# Patient Record
Sex: FEMALE | Race: WHITE | Employment: OTHER | URBAN - METROPOLITAN AREA
[De-identification: names, ages, dates, MRNs, and addresses within clinical notes are randomized per-mention and may not be internally consistent; named-entity substitution may affect disease eponyms.]

---

## 2017-01-18 ENCOUNTER — HOSPITAL ENCOUNTER (OUTPATIENT)
Age: 82
Setting detail: OUTPATIENT SURGERY
Discharge: HOME | End: 2017-01-18
Attending: INTERNAL MEDICINE | Admitting: INTERNAL MEDICINE
Payer: MEDICARE

## 2017-01-18 ENCOUNTER — ANESTHESIA EVENT (OUTPATIENT)
Dept: ENDOSCOPY | Age: 82
End: 2017-01-18
Payer: MEDICARE

## 2017-01-18 ENCOUNTER — ANESTHESIA (OUTPATIENT)
Dept: ENDOSCOPY | Age: 82
End: 2017-01-18
Payer: MEDICARE

## 2017-01-18 VITALS
OXYGEN SATURATION: 95 % | RESPIRATION RATE: 22 BRPM | HEART RATE: 71 BPM | SYSTOLIC BLOOD PRESSURE: 128 MMHG | HEIGHT: 65 IN | BODY MASS INDEX: 20.66 KG/M2 | DIASTOLIC BLOOD PRESSURE: 39 MMHG | TEMPERATURE: 98.8 F | WEIGHT: 124 LBS

## 2017-01-18 PROCEDURE — 76040000007: Performed by: INTERNAL MEDICINE

## 2017-01-18 PROCEDURE — 88305 TISSUE EXAM BY PATHOLOGIST: CPT | Performed by: INTERNAL MEDICINE

## 2017-01-18 PROCEDURE — 77030013140 HC MSK NEB VYRM -A: Performed by: INTERNAL MEDICINE

## 2017-01-18 PROCEDURE — 74011000250 HC RX REV CODE- 250: Performed by: INTERNAL MEDICINE

## 2017-01-18 PROCEDURE — 88341 IMHCHEM/IMCYTCHM EA ADD ANTB: CPT | Performed by: INTERNAL MEDICINE

## 2017-01-18 PROCEDURE — 77030009046 HC CATH BRNCH BLLN OCOA -B: Performed by: INTERNAL MEDICINE

## 2017-01-18 PROCEDURE — 88342 IMHCHEM/IMCYTCHM 1ST ANTB: CPT | Performed by: INTERNAL MEDICINE

## 2017-01-18 PROCEDURE — 87070 CULTURE OTHR SPECIMN AEROBIC: CPT | Performed by: INTERNAL MEDICINE

## 2017-01-18 PROCEDURE — 77030015483 HC NDL ASPIR OCOA -B: Performed by: INTERNAL MEDICINE

## 2017-01-18 PROCEDURE — 88173 CYTOPATH EVAL FNA REPORT: CPT | Performed by: INTERNAL MEDICINE

## 2017-01-18 PROCEDURE — 77030022556 HC FCPS BIOP TIS ENDOSC BSC -B: Performed by: INTERNAL MEDICINE

## 2017-01-18 PROCEDURE — 74011250636 HC RX REV CODE- 250/636

## 2017-01-18 PROCEDURE — 74011000250 HC RX REV CODE- 250

## 2017-01-18 PROCEDURE — 76060000032 HC ANESTHESIA 0.5 TO 1 HR: Performed by: INTERNAL MEDICINE

## 2017-01-18 PROCEDURE — 77030026438 HC STYL ET INTUB CARD -A: Performed by: ANESTHESIOLOGY

## 2017-01-18 RX ORDER — FENTANYL CITRATE 50 UG/ML
INJECTION, SOLUTION INTRAMUSCULAR; INTRAVENOUS AS NEEDED
Status: DISCONTINUED | OUTPATIENT
Start: 2017-01-18 | End: 2017-01-18 | Stop reason: HOSPADM

## 2017-01-18 RX ORDER — LIDOCAINE HYDROCHLORIDE 20 MG/ML
2 INJECTION, SOLUTION INFILTRATION; PERINEURAL
Status: DISCONTINUED | OUTPATIENT
Start: 2017-01-18 | End: 2017-01-18 | Stop reason: HOSPADM

## 2017-01-18 RX ORDER — MAGNESIUM 200 MG
1000 TABLET ORAL DAILY
COMMUNITY

## 2017-01-18 RX ORDER — ALBUTEROL SULFATE 90 UG/1
2 AEROSOL, METERED RESPIRATORY (INHALATION)
COMMUNITY

## 2017-01-18 RX ORDER — RALOXIFENE HYDROCHLORIDE 60 MG/1
60 TABLET, FILM COATED ORAL DAILY
COMMUNITY

## 2017-01-18 RX ORDER — MELATONIN 5 MG
5 CAPSULE ORAL
COMMUNITY

## 2017-01-18 RX ORDER — TOPIRAMATE 25 MG/1
25 TABLET ORAL 2 TIMES DAILY
COMMUNITY

## 2017-01-18 RX ORDER — LIDOCAINE HYDROCHLORIDE 20 MG/ML
JELLY TOPICAL ONCE
Status: DISCONTINUED | OUTPATIENT
Start: 2017-01-18 | End: 2017-01-18 | Stop reason: HOSPADM

## 2017-01-18 RX ORDER — PHENAZOPYRIDINE HYDROCHLORIDE 100 MG/1
100 TABLET, FILM COATED ORAL
COMMUNITY

## 2017-01-18 RX ORDER — SOLIFENACIN SUCCINATE 5 MG/1
5 TABLET, FILM COATED ORAL DAILY
COMMUNITY

## 2017-01-18 RX ORDER — GLYCOPYRROLATE 0.2 MG/ML
INJECTION INTRAMUSCULAR; INTRAVENOUS AS NEEDED
Status: DISCONTINUED | OUTPATIENT
Start: 2017-01-18 | End: 2017-01-18 | Stop reason: HOSPADM

## 2017-01-18 RX ORDER — ROCURONIUM BROMIDE 10 MG/ML
INJECTION, SOLUTION INTRAVENOUS AS NEEDED
Status: DISCONTINUED | OUTPATIENT
Start: 2017-01-18 | End: 2017-01-18 | Stop reason: HOSPADM

## 2017-01-18 RX ORDER — SERTRALINE HYDROCHLORIDE 100 MG/1
100 TABLET, FILM COATED ORAL DAILY
COMMUNITY

## 2017-01-18 RX ORDER — ERGOCALCIFEROL 1.25 MG/1
50000 CAPSULE ORAL
COMMUNITY

## 2017-01-18 RX ORDER — LIDOCAINE HYDROCHLORIDE 40 MG/ML
SOLUTION TOPICAL ONCE
Status: COMPLETED | OUTPATIENT
Start: 2017-01-18 | End: 2017-01-18

## 2017-01-18 RX ORDER — NEOSTIGMINE METHYLSULFATE 1 MG/ML
INJECTION INTRAVENOUS AS NEEDED
Status: DISCONTINUED | OUTPATIENT
Start: 2017-01-18 | End: 2017-01-18 | Stop reason: HOSPADM

## 2017-01-18 RX ORDER — PROPOFOL 10 MG/ML
INJECTION, EMULSION INTRAVENOUS AS NEEDED
Status: DISCONTINUED | OUTPATIENT
Start: 2017-01-18 | End: 2017-01-18 | Stop reason: HOSPADM

## 2017-01-18 RX ADMIN — GLYCOPYRROLATE 0.2 MG: 0.2 INJECTION INTRAMUSCULAR; INTRAVENOUS at 15:06

## 2017-01-18 RX ADMIN — ROCURONIUM BROMIDE 25 MG: 10 INJECTION, SOLUTION INTRAVENOUS at 14:37

## 2017-01-18 RX ADMIN — LIDOCAINE HYDROCHLORIDE: 40 SOLUTION TOPICAL at 14:01

## 2017-01-18 RX ADMIN — PROPOFOL 120 MG: 10 INJECTION, EMULSION INTRAVENOUS at 14:36

## 2017-01-18 RX ADMIN — NEOSTIGMINE METHYLSULFATE 2 MG: 1 INJECTION INTRAVENOUS at 15:06

## 2017-01-18 RX ADMIN — FENTANYL CITRATE 100 MCG: 50 INJECTION, SOLUTION INTRAMUSCULAR; INTRAVENOUS at 14:35

## 2017-01-18 NOTE — H&P
79 yo with PMHx of COPD presenting for EBUS of paratracheal lymphnode and bronch. Pt continous to smoke and has baseline SOB. Gen: awake, alert, in no distress  Lungs: wheezing bilaterally  Abd: soft/nt  LE: no edema  Neuro: moving all extremeties    - will proceed with EBUS under general anesthesia.

## 2017-01-18 NOTE — PROGRESS NOTES
See anesthesia flow-sheet for vital signs and procedural sedation. No respiratory distress. No subcutaneous air palpated at chest/back/neck/abdomen. Samples sent to the lab. Stable for transfer to recovery per anesthesia.

## 2017-01-18 NOTE — DISCHARGE INSTRUCTIONS
Fabio Tavarez  [id number]  1935        BRONCHOSCOPY / EUS / EBUS DISCHARGE INSTRUCTIONS  Discomfort:  Sore throat- throat lozenges or warm salt water gargle  redness at IV site- apply warm compress to area; if redness or soreness persist- contact your physician  Gaseous discomfort- walking, belching will help relieve any discomfort  You may not operate a vehicle for 12 hours  You may not engage in an occupation involving machinery or appliances for rest of today  You may not drink alcoholic beverages for at least 12 hours  Avoid making any critical decisions for at least 24 hour  Blood tinged secretions - this should stop within 2-3 hours  DIET  Nothing by mouth- do not eat or drink for two hours. You may eat and drink after 5 pm  You may resume your regular diet - however - remember your colon is empty and a heavy meal will produce gas. Avoid these foods: vegetables, fried / greasy foods, carbonated drinks  ACTIVITY  You may resume your normal daily activities however it is recommended that you spend the remainder of the day resting - avoid any strenuous activity. CALL M.D.  ANY SIGN OF   Increasing pain, nausea, vomiting  Abdominal distension (swelling)  New increased bleeding (oral or rectal)  Fever (chills)  Pain in chest area  Bloody discharge from nose or mouth  Shortness of breath     You  may not take any Advil, Aspirin, Ibuprofen, Motrin, Aleve, or Goodys for 1 day, ONLY Tylenol as needed for pain.     Call physicians office for following  Results of procedure / biopsy in 5 days  Telephone # [phone]

## 2017-01-18 NOTE — IP AVS SNAPSHOT
[place] Amandalidia 
 
 
 [address] [facility] 
[phone] Patient: Mary Manley MRN: [id number] :1935 You are allergic to the following No active allergies Recent Documentation Height Weight Breastfeeding? BMI OB Status Smoking Status 1.651 m 56.2 kg No 20.63 kg/m2 Postmenopausal Current Every Day Smoker Unresulted Labs Order Current Status CULTURE, RESPIRATORY/SPUTUM/BRONCH W GRAM STAIN In process Emergency Contacts Name Discharge Info Relation Home Work Mobile Jana Stein  Son [22]   [phone] About your hospitalization You were admitted on:  2017 You last received care in the:  [facility] ENDOSCOPY You were discharged on:  2017 Unit phone number:  [phone] Why you were hospitalized Your primary diagnosis was:  Not on File Providers Seen During Your Hospitalizations Provider Role Specialty Primary office phone Marie Johnson MD Attending Provider Pulmonary Disease [phone] Your Primary Care Physician (PCP) Primary Care Physician Office Phone Office Fax Mauro Major [phone] Follow-up Information None Current Discharge Medication List  
  
ASK your doctor about these medications Dose & Instructions Dispensing Information Comments Morning Noon Evening Bedtime  
 cyanocobalamin 1,000 mcg sublingual tablet Commonly known as:  VITAMIN B-12 Your next dose is: Today, Tomorrow Other:  _________ Dose:  1000 mcg Take 1,000 mcg by mouth daily. Refills:  0  
     
   
   
   
  
 ergocalciferol 50,000 unit capsule Commonly known as:  ERGOCALCIFEROL Your next dose is: Today, Tomorrow Other:  _________ Dose:  66593 Units Take 50,000 Units by mouth.   
 Refills:  0  
     
   
   
   
  
 melatonin 5 mg Cap capsule Your next dose is: Today, Tomorrow Other:  _________ Dose:  5 mg Take 5 mg by mouth nightly. Refills:  0 PYRIDIUM 100 mg tablet Generic drug:  phenazopyridine Your next dose is: Today, Tomorrow Other:  _________ Dose:  100 mg Take 100 mg by mouth three (3) times daily (after meals). Refills:  0  
     
   
   
   
  
 raloxifene 60 mg tablet Commonly known as:  EVISTA Your next dose is: Today, Tomorrow Other:  _________ Dose:  60 mg Take 60 mg by mouth daily. Refills:  0 SENNA LAXATIVE PO Your next dose is: Today, Tomorrow Other:  _________ Take  by mouth. Refills:  0  
     
   
   
   
  
 sertraline 100 mg tablet Commonly known as:  ZOLOFT Your next dose is: Today, Tomorrow Other:  _________ Dose:  100 mg Take 100 mg by mouth daily. Refills:  0 STIOLTO RESPIMAT 2.5-2.5 mcg/actuation Mist  
Generic drug:  tiotropium-olodaterol Your next dose is: Today, Tomorrow Other:  _________ Take  by inhalation. Indications: BRONCHOSPASM PREVENTION WITH COPD Refills:  0  
     
   
   
   
  
 topiramate 25 mg tablet Commonly known as:  TOPAMAX Your next dose is: Today, Tomorrow Other:  _________ Dose:  25 mg Take 25 mg by mouth two (2) times a day. Refills:  0 VENTOLIN HFA 90 mcg/actuation inhaler Generic drug:  albuterol Your next dose is: Today, Tomorrow Other:  _________ Dose:  2 Puff Take 2 Puffs by inhalation every four (4) hours as needed for Wheezing. Refills:  0  
     
   
   
   
  
 VESIcare 5 mg tablet Generic drug:  solifenacin Your next dose is: Today, Tomorrow Other:  _________ Dose:  5 mg Take 5 mg by mouth daily. Refills:  0 Discharge Instructions Ban Garcia [id number] 
1935 
  
  
BRONCHOSCOPY / EUS / EBUS DISCHARGE INSTRUCTIONS Discomfort: 
Sore throat- throat lozenges or warm salt water gargle 
redness at IV site- apply warm compress to area; if redness or soreness persist- contact your physician Gaseous discomfort- walking, belching will help relieve any discomfort You may not operate a vehicle for 12 hours You may not engage in an occupation involving machinery or appliances for rest of today You may not drink alcoholic beverages for at least 12 hours Avoid making any critical decisions for at least 24 hour Blood tinged secretions  this should stop within 2-3 hours DIET Nothing by mouth- do not eat or drink for two hours. You may eat and drink after 5 pm 
You may resume your regular diet  however - remember your colon is empty and a heavy meal will produce gas. Avoid these foods: vegetables, fried / greasy foods, carbonated drinks ACTIVITY You may resume your normal daily activities however it is recommended that you spend the remainder of the day resting - avoid any strenuous activity. CALL M.D. ANY SIGN OF Increasing pain, nausea, vomiting Abdominal distension (swelling) New increased bleeding (oral or rectal) Fever (chills) Pain in chest area Bloody discharge from nose or mouth Shortness of breath 
  
You  may not take any Advil, Aspirin, Ibuprofen, Motrin, Aleve, or Goodys for 1 day, ONLY Tylenol as needed for pain. 
  
Call physicians office for following Results of procedure / biopsy in 5 days Telephone # [phone] 
  
  
  
  
 
Discharge Orders None [facility] Announcement We are excited to announce that we are making your provider's discharge notes available to you in [employer]. You will see these notes when they are completed and signed by the physician that discharged you from your recent hospital stay.   If you have any questions or concerns about any information you see in [facility], please call the Health Information Department where you were seen or reach out to your Primary Care Provider for more information about your plan of care. Introducing [facility]! Kylie Singh introduces [facility] patient portal. Now you can access parts of your medical record, email your doctor's office, and request medication refills online. 1. In your internet browser, go to [email or web address] 2. Click on the First Time User? Click Here link in the Sign In box. You will see the New Member Sign Up page. 3. Enter your [facility] Access Code exactly as it appears below. You will not need to use this code after youve completed the sign-up process. If you do not sign up before the expiration date, you must request a new code. · [facility] Access Code: [email or web address] Expires: 4/12/2017  3:51 PM 
 
4. Enter the last four digits of your Social Security Number (xxxx) and Date of Birth (mm/dd/yyyy) as indicated and click Submit. You will be taken to the next sign-up page. 5. Create a [facility] ID. This will be your [facility] login ID and cannot be changed, so think of one that is secure and easy to remember. 6. Create a [facility] password. You can change your password at any time. 7. Enter your Password Reset Question and Answer. This can be used at a later time if you forget your password. 8. Enter your e-mail address. You will receive e-mail notification when new information is available in [address] 9. Click Sign Up. You can now view and download portions of your medical record. 10. Click the Download Summary menu link to download a portable copy of your medical information. If you have questions, please visit the Frequently Asked Questions section of the [employer] website. Remember, [facility] is NOT to be used for urgent needs. For medical emergencies, dial 911. Now available from your iPhone and Android! General Information Please provide this summary of care documentation to your next provider. Patient Signature:  ____________________________________________________________ Date:  ____________________________________________________________  
  
Re[place]do [place] Provider Signature:  ____________________________________________________________ Date:  ____________________________________________________________

## 2017-01-18 NOTE — DISCHARGE SUMMARY
Lisa Alvarez  [id number]  1935      BRONCHOSCOPY / EUS / EBUS DISCHARGE INSTRUCTIONS  Discomfort:  Sore throat- throat lozenges or warm salt water gargle  redness at IV site- apply warm compress to area; if redness or soreness persist- contact your physician  Gaseous discomfort- walking, belching will help relieve any discomfort  You may not operate a vehicle for 12 hours  You may not engage in an occupation involving machinery or appliances for rest of today  You may not drink alcoholic beverages for at least 12 hours  Avoid making any critical decisions for at least 24 hour  Blood tinged secretions - this should stop within 2-3 hours  DIET  Nothing by mouth- do not eat or drink for two hours. You may eat and drink after 5 pm   You may resume your regular diet - however -  remember your colon is empty and a heavy meal will produce gas. Avoid these foods:  vegetables, fried / greasy foods, carbonated drinks  ACTIVITY  You may resume your normal daily activities however it is recommended that you spend the remainder of the day resting -  avoid any strenuous activity. CALL M.D. ANY SIGN OF   Increasing pain, nausea, vomiting  Abdominal distension (swelling)  New increased bleeding (oral or rectal)  Fever (chills)  Pain in chest area  Bloody discharge from nose or mouth  Shortness of breath    You smart list may or may not take any Advil, Aspirin, Ibuprofen, Motrin, Aleve, or Goodys for 1 day, ONLY  Tylenol as needed for pain.     Call [phone] office for following  Results of procedure / biopsy in 5 days  Telephone #  [phone]

## 2017-01-18 NOTE — ANESTHESIA PREPROCEDURE EVALUATION
Anesthetic History No history of anesthetic complications Review of Systems / Medical History Patient summary reviewed, nursing notes reviewed and pertinent labs reviewed Pulmonary COPD (on home oxygen at night) Smoker Asthma : well controlled Neuro/Psych Psychiatric history (anxiety/depression) Cardiovascular Exercise tolerance: <4 METS: Uses a walker GI/Hepatic/Renal 
Within defined limits Endo/Other Within defined limits Other Findings Comments: Essential tremors s/p DBS, chronic indwelling catheter Physical Exam 
 
Airway Mallampati: II 
TM Distance: 4 - 6 cm Neck ROM: normal range of motion Mouth opening: Normal 
 
 Cardiovascular Rhythm: regular Rate: normal 
 
 
 
 Dental 
 
Dentition: Upper dentition intact and Lower dentition intact Pulmonary Wheezes Abdominal 
 
 
 
 Other Findings Anesthetic Plan ASA: 3 Anesthesia type: general 
 
 
 
 
Induction: Intravenous Anesthetic plan and risks discussed with: Patient

## 2017-01-18 NOTE — ANESTHESIA POSTPROCEDURE EVALUATION
Post-Anesthesia Evaluation and Assessment Patient: Lilibeth Thorne MRN: [id number]  SSN: [id number] YOB: 1935  Age: 80 y.o. Sex: female Cardiovascular Function/Vital Signs Visit Vitals  /43  Pulse 76  Temp 37.1 °C (98.8 °F)  Resp 23  
 Ht 5' 5\" (1.651 m)  Wt 56.2 kg (124 lb)  SpO2 95%  Breastfeeding No  
 BMI 20.63 kg/m2 Patient is status post general anesthesia for Procedure(s): ENDOSCOPIC BRONCHOSCOPY ULTRASOUND (EBUS) BRONCHOSCOPY 
FINE NEEDLE ASPIRATION 
BRONCHIAL ALVEOLAR LAVAGE. Nausea/Vomiting: None Postoperative hydration reviewed and adequate. Pain: 
Pain Scale 1: Numeric (0 - 10) (01/18/17 1538) Pain Intensity 1: 0 (01/18/17 1538) Managed Neurological Status: At baseline Mental Status and Level of Consciousness: Arousable Pulmonary Status:  
O2 Device: Room air (01/18/17 1538) Adequate oxygenation and airway patent Complications related to anesthesia: None Post-anesthesia assessment completed. No concerns Signed By: Lillie Rosa MD   
 January 18, 2017

## 2017-01-18 NOTE — PROCEDURES
[address]  [address]  [address] [facility] [address]  [address]  [phone]    [place] Dates  1935  [id number]      Date of Procedure: 1/18/2017    Preoperative diagnosis: ebus    Procedure: Procedure(s):  ENDOSCOPIC BRONCHOSCOPY ULTRASOUND (EBUS)  BRONCHOSCOPY  FINE NEEDLE ASPIRATION  BRONCHIAL ALVEOLAR LAVAGE    Indication: abnormal chest CT    :  Greg Rosen MD    Assistant(s): Endoscopy Technician-1: Ann Flannery  Endoscopy RN-1: Sean Godinez RN    Anesthesia/Sedation:  General anesthesia      Procedure Details:  After infomed consent was obtained for the procedure, with all risks and benefits of procedure explained the patient was taken to the endoscopy suite and placed in the supine position. Following sequential administration of sedation as per above, the bronchoscope was inserted into the ETT and advanced under direct vision to the tracheobronchial tree. Findings:   EBUS done first. Enlarged paratracheal lymphnode 4R. TBNA done on lymphnode x5 with endobronchial ultrasounds. Bronchoscopy then done with diagnostic bronch. Thin white mucus found in RLL, RUL and suctioned. Airways otherwise appeared normal. No lesion or erythema. Therapies:   None    Specimens:   See above. Sputum sent for culture. Complications:   None noted; patient tolerated the procedure well. EBL:  Minimal           Impression:    Lymphadenopathy  Abnormal Chest CT      Recommendations:   Await cytology and cultures.    Follow up with Dr. Lexus Mares MD  1/18/2017  3:12 PM

## 2017-01-20 LAB
BACTERIA SPEC CULT: NORMAL
BACTERIA SPEC CULT: NORMAL
GRAM STN SPEC: NORMAL
SERVICE CMNT-IMP: NORMAL

## 2017-02-15 ENCOUNTER — OP HISTORICAL/CONVERTED ENCOUNTER (OUTPATIENT)
Dept: OTHER | Age: 82
End: 2017-02-15

## 2017-03-01 ENCOUNTER — OP HISTORICAL/CONVERTED ENCOUNTER (OUTPATIENT)
Dept: OTHER | Age: 82
End: 2017-03-01

## 2017-04-03 ENCOUNTER — OP HISTORICAL/CONVERTED ENCOUNTER (OUTPATIENT)
Dept: OTHER | Age: 82
End: 2017-04-03

## 2017-05-01 ENCOUNTER — OP HISTORICAL/CONVERTED ENCOUNTER (OUTPATIENT)
Dept: OTHER | Age: 82
End: 2017-05-01

## 2017-08-09 ENCOUNTER — OP HISTORICAL/CONVERTED ENCOUNTER (OUTPATIENT)
Dept: OTHER | Age: 82
End: 2017-08-09

## 2018-03-27 ENCOUNTER — OP HISTORICAL/CONVERTED ENCOUNTER (OUTPATIENT)
Dept: OTHER | Age: 83
End: 2018-03-27

## 2022-01-06 NOTE — ROUTINE PROCESS
Ron Strong  1935  [id number]    Situation:  Verbal report received from: Aster Alcala RN  Procedure: Procedure(s):  ENDOSCOPIC BRONCHOSCOPY ULTRASOUND (EBUS)  BRONCHOSCOPY  FINE NEEDLE ASPIRATION  BRONCHIAL ALVEOLAR LAVAGE    Background:    Preoperative diagnosis: ebus  Postoperative diagnosis: Lymphadenopathy  Abnormal Chest CT    :  Dr. Michael Navarro  Assistant(s): Endoscopy Technician-1: Arturo Flannery  Endoscopy RN-1: Norma Coello RN    Specimens: * No specimens in log *  H. Pylori  no    Assessment:  Intra-procedure medications     Anesthesia gave intra-procedure sedation and medications, see anesthesia flow sheet yes    Intravenous fluids: NS@ KVO     Vital signs stable     Abdominal assessment: round and soft   No crepitus felt around collarbones    Recommendation:  Discharge patient per MD order. Family or Friend   Permission to share finding with family or friend yes    Endoscopy discharge instructions have been reviewed and given to patient and son. The patient and son verbalized understanding and acceptance of instructions.
complains of pain/discomfort

## (undated) DEVICE — SET EXTN TBNG L BOR 4 W STPCOCK ST 32IN PRIMING VOL 6ML

## (undated) DEVICE — 3M™ CUROS™ DISINFECTING CAP FOR NEEDLELESS CONNECTORS 270/CARTON 20 CARTONS/CASE CFF1-270: Brand: CUROS™

## (undated) DEVICE — CONTAINER SPEC 20 ML LID NEUT BUFF FORMALIN 10 % POLYPR STS

## (undated) DEVICE — BLOCK BITE PEDIATRIC 14 MM MOUTHPIECE POLYETH ENDO-GUARD LTX 69100] AVANOS MEDICAL]

## (undated) DEVICE — CATH IV AUTOGRD BC PNK 20GA 25 -- INSYTE

## (undated) DEVICE — BAG BELONG PT PERS CLEAR HANDL

## (undated) DEVICE — BITEBLOCK ENDOSCP 60FR MAXI WHT POLYETH STURDY W/ VELC WVN

## (undated) DEVICE — AIRLIFE™ FACE TENT MASK VINYL: Brand: AIRLIFE™

## (undated) DEVICE — BAG SPEC BIOHZRD 10 X 10 IN --

## (undated) DEVICE — TRAP SUC MUCOUS 70ML -- MEDICHOICE MEDLINE

## (undated) DEVICE — TUBING ADMIN SET INTRAV ARTERI -- CONVERT TO ITEM 340436

## (undated) DEVICE — Device

## (undated) DEVICE — FCPS BIOP PULM RAD JAW 100CML -- BX/10 M00515180

## (undated) DEVICE — NEEDLE ASPIR 22GA L40MM WRK L70CM SYR VLV ECHOGENIC DIMPLED

## (undated) DEVICE — KIT COLON W/ 1.1OZ LUB AND 2 END

## (undated) DEVICE — BRUSH CYTO BRONCHSCP 1.5/140MM -- CELLEBRITY

## (undated) DEVICE — SYRINGE 50ML E/T

## (undated) DEVICE — MASK,OXYGEN,3-IN-1,ADULT,7,SC: Brand: MEDLINE

## (undated) DEVICE — AIRLIFE™ CORRUGATED FLEXIBLE EVA TUBING FOR AEROSOL AND IPPB USE, SEGMENTED, 6 FEET (1.8 M) LENGTH, 22 MM I.D.: Brand: AIRLIFE™

## (undated) DEVICE — KENDALL RADIOLUCENT FOAM MONITORING ELECTRODE RECTANGULAR SHAPE: Brand: KENDALL

## (undated) DEVICE — CANN NASAL O2 CAPNOGRAPHY AD -- FILTERLINE

## (undated) DEVICE — 1200 GUARD II KIT W/5MM TUBE W/O VAC TUBE: Brand: GUARDIAN

## (undated) DEVICE — SOLIDIFIER MEDC 1200ML -- CONVERT TO 356117

## (undated) DEVICE — Device: Brand: BALLOON

## (undated) DEVICE — BASIN EMESIS 500CC ROSE 250/CS 60/PLT: Brand: MEDEGEN MEDICAL PRODUCTS, LLC

## (undated) DEVICE — (D)NEBLZR PREFIL STRL H2O 1000